# Patient Record
Sex: FEMALE
[De-identification: names, ages, dates, MRNs, and addresses within clinical notes are randomized per-mention and may not be internally consistent; named-entity substitution may affect disease eponyms.]

---

## 2019-12-11 PROBLEM — Z00.00 ENCOUNTER FOR PREVENTIVE HEALTH EXAMINATION: Status: ACTIVE | Noted: 2019-12-11

## 2019-12-13 ENCOUNTER — APPOINTMENT (OUTPATIENT)
Dept: NEUROSURGERY | Facility: CLINIC | Age: 79
End: 2019-12-13
Payer: MEDICARE

## 2019-12-13 VITALS — HEIGHT: 60 IN | BODY MASS INDEX: 19.63 KG/M2 | WEIGHT: 100 LBS

## 2019-12-13 DIAGNOSIS — M48.062 SPINAL STENOSIS, LUMBAR REGION WITH NEUROGENIC CLAUDICATION: ICD-10-CM

## 2019-12-13 DIAGNOSIS — M43.17 SPONDYLOLISTHESIS, LUMBOSACRAL REGION: ICD-10-CM

## 2019-12-13 PROCEDURE — 99202 OFFICE O/P NEW SF 15 MIN: CPT

## 2019-12-13 NOTE — ASSESSMENT
[FreeTextEntry1] : 79 year old lady with over a year hx of low back pain with radiation to both lower extremities, worse with standing and walking. The pain and paresthesia is mainly in the bilateral anterior thighs and left lateral calf. The sx is much relieved with sitting and lying down. She also has been noted to have altered her posture to sccomodate her symptoms by tilting her back forward and towards the left. She does notice weakness in both LEs upon walking orstanding, worse in the right LE. Trialed PT/acupuncture, and injection therapy w/o definitive improvement.\par \par On exam, she has a scoliotic posture towards the left. Her gait is steady but slow and cautious. Negative SLR bilaterally. Motor is intact while sitting. Sensory is intact as well. Absent right KJ. 2+ KJ on the left. No c/o of sphincter disturbance.\par \par Review of MRI of LS spine suggested severe stenosis at L3-4 and L4-5 worse at L3-4. There is also L3-4 grade 1 spondylolisthesis.\par \par Discussed at length with pt and her children about indications, benefits, risks and alternatives of decompressive laminectomy at L3-4 and L4-5 along with interlaminar stabilization. Risks of the surgery include are not limited to wound infection, paralysis, CSF leakage, instrumentation failure, , and failure to improve. She understood our discussion well and will consider further before making her decision.